# Patient Record
Sex: MALE | Race: WHITE
[De-identification: names, ages, dates, MRNs, and addresses within clinical notes are randomized per-mention and may not be internally consistent; named-entity substitution may affect disease eponyms.]

---

## 2019-10-31 ENCOUNTER — RECORDS - HEALTHEAST (OUTPATIENT)
Dept: ADMINISTRATIVE | Facility: OTHER | Age: 69
End: 2019-10-31

## 2019-11-01 ENCOUNTER — AMBULATORY - HEALTHEAST (OUTPATIENT)
Dept: SURGERY | Facility: CLINIC | Age: 69
End: 2019-11-01

## 2019-11-01 DIAGNOSIS — K40.20 BILATERAL INGUINAL HERNIA: ICD-10-CM

## 2019-11-08 ENCOUNTER — OFFICE VISIT - HEALTHEAST (OUTPATIENT)
Dept: SURGERY | Facility: CLINIC | Age: 69
End: 2019-11-08

## 2019-11-08 DIAGNOSIS — R10.31 INGUINAL PAIN OF BOTH SIDES: ICD-10-CM

## 2019-11-08 DIAGNOSIS — R10.32 INGUINAL PAIN OF BOTH SIDES: ICD-10-CM

## 2019-11-08 RX ORDER — DIAZEPAM 5 MG
TABLET ORAL
Refills: 0 | Status: SHIPPED | COMMUNITY
Start: 2019-11-01

## 2019-11-08 ASSESSMENT — MIFFLIN-ST. JEOR: SCORE: 1485.68

## 2019-11-18 ENCOUNTER — AMBULATORY - HEALTHEAST (OUTPATIENT)
Dept: SURGERY | Facility: CLINIC | Age: 69
End: 2019-11-18

## 2019-11-19 ENCOUNTER — RECORDS - HEALTHEAST (OUTPATIENT)
Dept: ADMINISTRATIVE | Facility: OTHER | Age: 69
End: 2019-11-19

## 2019-12-05 ENCOUNTER — RECORDS - HEALTHEAST (OUTPATIENT)
Dept: ADMINISTRATIVE | Facility: OTHER | Age: 69
End: 2019-12-05

## 2019-12-05 ENCOUNTER — COMMUNICATION - HEALTHEAST (OUTPATIENT)
Dept: SURGERY | Facility: CLINIC | Age: 69
End: 2019-12-05

## 2021-06-03 VITALS
WEIGHT: 156.4 LBS | HEIGHT: 70 IN | BODY MASS INDEX: 22.39 KG/M2 | SYSTOLIC BLOOD PRESSURE: 116 MMHG | HEART RATE: 58 BPM | DIASTOLIC BLOOD PRESSURE: 74 MMHG | RESPIRATION RATE: 14 BRPM | OXYGEN SATURATION: 98 %

## 2021-06-03 NOTE — PROGRESS NOTES
"HPI:  Karlo Alvarado is a 68 y.o. male who was referred to me by Provider, [facility] for bilateral inguinal discomfort.  He denies any obvious bulges but does have a several month history of smoldering dull aches in the bilateral inguinal regions.  He is fairly active and does a fair amount of running and gym workouts.  He denies any fevers, chills, nausea or vomiting.  His last colonoscopy was 6 years ago which he says was normal.    Allergies:Patient has no known allergies.    No past medical history on file.    No past surgical history on file.    CURRENT MEDS:  Current Outpatient Medications   Medication Sig Dispense Refill     diazePAM (VALIUM) 5 MG tablet TAKE 1 TABLET BY MOUTH 2 HOURS BEFORE APPOINTMENT AND 1 TABLET AT APPOINTMENT.  0     No current facility-administered medications for this visit.        Family history-reviewed and noncontributory to the current admission     reports that he has never smoked. He has never used smokeless tobacco. He reports current alcohol use. He reports that he does not use drugs.    Review of Systems -   The 12 system review is within normal limits except for as mentioned above.  General ROS: No complaints or constitutional symptoms  Ophthalmic ROS: No complaints of visual changes  Skin: No complaints or symptoms   Endocrine: No complaints or symptoms  Hematologic/Lymphatic: No symptoms or complaints  Psychiatric: No symptoms or complaints  Respiratory ROS: no cough, shortness of breath, or wheezing  Cardiovascular ROS: no chest pain or dyspnea on exertion  Gastrointestinal ROS: As per HPI  Genito-Urinary ROS: no dysuria, trouble voiding, or hematuria  Musculoskeletal ROS: no joint or muscle pain  Neurological ROS: no TIA or stroke symptoms    /74   Pulse (!) 58   Resp 14   Ht 5' 10\" (1.778 m)   Wt 156 lb 6.4 oz (70.9 kg)   SpO2 98%   BMI 22.44 kg/m    Body mass index is 22.44 kg/m .    EXAM:  GENERAL: Well developed male, No acute distress, pleasant " and conversant   EYES: Pupils equal, round and reactive, no scleral icterus  CARDIAC: Regular rate and rhythm, no obvious murmurs noted  CHEST/LUNG: Clear to ascultation bilaterally, No ronchi, No wheezes  ABDOMEN: Soft, bilateral inguinal canals are slightly weak but with no obvious defects noted  SKIN: Pink, warm and dry, no obvious rashes or lesions   NEURO:No focal deficits, ambulatory  MUSCULOSKELETAL:No obvious deformities, no swelling, normal appearing        Assessment/Plan:   Karlo Alvarado is a 68 y.o. male with bilateral inguinal discomfort.  I was unable to appreciate any obvious hernias although he does have some mild weakness in the inguinal canals.  Given his activity level he may have musculoskeletal strain versus hip etiology which is radiating medially.  We had an extensive discussion regarding hernias, occult hernias and musculoskeletal strains.  We discussed the option of observation with rest and anti-inflammatory medications.  However, he would like to know if he is got hernias sooner rather later and prefers the ultrasound route.  At this point I will order bilateral inguinal ultrasounds.  I will call him with results.    Maxx Culver D.O. FACS  [phone]  [facility] Department of Surgery

## 2021-06-03 NOTE — PROGRESS NOTES
Pt called to request his bilateral US groin orders be sent to [facility], closer to his home. Order was faxed to their US department. Pt instructed to call our office back once he has the appointment scheduled in order for Dr. Culver to call him with results.     Of note, the US images and report can be pushed to [facility] system from [facility].

## 2021-06-04 NOTE — TELEPHONE ENCOUNTER
Pts Care Provider: Dr. Culver    Caller: Karlo    Phone Number: [phone]  OK to leave message: Yes    Reason for Call: Pt is calling to check on the status of the US that was performed by [facility]. He has not received a call to discuss the results or continued care for him.  Please call the patient to discuss